# Patient Record
Sex: MALE | Race: WHITE | NOT HISPANIC OR LATINO | Employment: FULL TIME | URBAN - METROPOLITAN AREA
[De-identification: names, ages, dates, MRNs, and addresses within clinical notes are randomized per-mention and may not be internally consistent; named-entity substitution may affect disease eponyms.]

---

## 2023-02-10 ENCOUNTER — OFFICE VISIT (OUTPATIENT)
Dept: FAMILY MEDICINE | Facility: CLINIC | Age: 33
End: 2023-02-10
Payer: COMMERCIAL

## 2023-02-10 VITALS
HEIGHT: 70 IN | TEMPERATURE: 96.7 F | RESPIRATION RATE: 19 BRPM | SYSTOLIC BLOOD PRESSURE: 110 MMHG | OXYGEN SATURATION: 97 % | HEART RATE: 90 BPM | BODY MASS INDEX: 25.34 KG/M2 | WEIGHT: 177 LBS | DIASTOLIC BLOOD PRESSURE: 72 MMHG

## 2023-02-10 DIAGNOSIS — G44.209 TENSION HEADACHE: Primary | ICD-10-CM

## 2023-02-10 PROCEDURE — 99203 OFFICE O/P NEW LOW 30 MIN: CPT

## 2023-02-10 RX ORDER — CYCLOBENZAPRINE HCL 5 MG
5 TABLET ORAL 2 TIMES DAILY PRN
Qty: 10 TABLET | Refills: 0 | Status: SHIPPED | OUTPATIENT
Start: 2023-02-10

## 2023-02-10 ASSESSMENT — ENCOUNTER SYMPTOMS: HEADACHES: 1

## 2023-02-10 NOTE — PROGRESS NOTES
"  Assessment & Plan     Tension headache  Nightly unilateral headache relieved by ibuprofen.  Not accompanied by any visual changes, no known aura, no nausea vomiting.  Did have 1 episode of feeling unbalanced but has not had a repeat since.  Treat for tension headache and assess further if no relief with recommended stretching, heat, ice, and stress relief measures, as well as Flexeril.  - cyclobenzaprine (FLEXERIL) 5 MG tablet; Take 1 tablet (5 mg) by mouth 2 times daily as needed for muscle spasms    Patient instructed on when to seek immediate medical care.  Above instructions included in AVS.  Discussed ongoing evaluation should this plan of care not be effective, and patient will return to clinic in 10 days if no relief.    Patient advised on recommended screening including cholesterol and diabetes screening.  He reports he is overdue for an annual physical.  Also recommended for tetanus and influenza vaccination.             BMI:   Estimated body mass index is 25.4 kg/m  as calculated from the following:    Height as of this encounter: 1.778 m (5' 10\").    Weight as of this encounter: 80.3 kg (177 lb).           No follow-ups on file.    YASMANY Maldonado CNP  [facility]    Subjective   Matthew is a 32 year old, presenting for the following health issues:  Headache (Daily. Worse at night. )      Matthew is a 32-year-old male ambulatory to clinic accompanied by self.  He reports headaches every night for the past 2 weeks. Does seem to be improved with caffeine. Always on the right side. No n/v/d. No visions changes. No abnormal smells. Had episode of being off balance when headaches of initially began. No sensitivity to light or sound. Caffeine seems to improve symptoms. Used to get headaches with excessive salt in ramen, has tried drinking extra water to see if that helps. Does report some tightness in neck and shoulders. Slept in a different bed 2 weeks ago.  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    Headache     History of Present Illness       Headaches:   Since the patient's last clinic visit, headaches are: worsened  The patient is getting headaches:  Every night for the past 2 weeks  He is not able to do normal daily activities when he has a migraine.  The patient is taking the following rescue/relief medications:  Ibuprofen (Advil, Motrin)   Patient states \"I get total relief\" from the rescue/relief medications.   The patient is taking the following medications to prevent migraines:  No medications to prevent migraines  In the past 4 weeks, the patient has gone to an Urgent Care or Emergency Room 0 times times due to headaches.    He eats 0-1 servings of fruits and vegetables daily.He consumes 0 sweetened beverage(s) daily.He exercises with enough effort to increase his heart rate 9 or less minutes per day.  He exercises with enough effort to increase his heart rate 3 or less days per week.   He is taking medications regularly.             Review of Systems   Neurological: Positive for headaches.    One episode of imbalance while ambulating.   Constitutional, HEENT, cardiovascular, pulmonary, gi and gu systems are negative, except as otherwise noted.      Objective    /72 (BP Location: Right arm, Patient Position: Sitting, Cuff Size: Adult Regular)   Pulse 90   Temp (!) 96.7  F (35.9  C) (Tympanic)   Resp 19   Ht 1.778 m (5' 10\")   Wt 80.3 kg (177 lb)   SpO2 97%   BMI 25.40 kg/m    Body mass index is 25.4 kg/m .  Physical Exam                       "

## 2023-02-12 ENCOUNTER — HEALTH MAINTENANCE LETTER (OUTPATIENT)
Age: 33
End: 2023-02-12

## 2023-03-02 ENCOUNTER — OFFICE VISIT (OUTPATIENT)
Dept: FAMILY MEDICINE | Facility: CLINIC | Age: 33
End: 2023-03-02
Payer: COMMERCIAL

## 2023-03-02 VITALS
OXYGEN SATURATION: 99 % | TEMPERATURE: 97.9 F | SYSTOLIC BLOOD PRESSURE: 106 MMHG | HEART RATE: 101 BPM | WEIGHT: 181.1 LBS | DIASTOLIC BLOOD PRESSURE: 78 MMHG | BODY MASS INDEX: 25.99 KG/M2 | RESPIRATION RATE: 14 BRPM

## 2023-03-02 DIAGNOSIS — G43.009 MIGRAINE WITHOUT AURA AND WITHOUT STATUS MIGRAINOSUS, NOT INTRACTABLE: ICD-10-CM

## 2023-03-02 DIAGNOSIS — J32.9 RHINOSINUSITIS: Primary | ICD-10-CM

## 2023-03-02 PROBLEM — R51.9 HEADACHE: Status: ACTIVE | Noted: 2021-12-11

## 2023-03-02 PROCEDURE — 99213 OFFICE O/P EST LOW 20 MIN: CPT

## 2023-03-02 RX ORDER — SUMATRIPTAN 50 MG/1
50 TABLET, FILM COATED ORAL
Qty: 6 TABLET | Refills: 0 | Status: SHIPPED | OUTPATIENT
Start: 2023-03-02

## 2023-03-02 ASSESSMENT — ENCOUNTER SYMPTOMS
ALLERGIC/IMMUNOLOGIC NEGATIVE: 1
CARDIOVASCULAR NEGATIVE: 1
MUSCULOSKELETAL NEGATIVE: 1
HEMATOLOGIC/LYMPHATIC NEGATIVE: 1
HEADACHES: 1
RESPIRATORY NEGATIVE: 1
ENDOCRINE NEGATIVE: 1
EYES NEGATIVE: 1
CONSTITUTIONAL NEGATIVE: 1
GASTROINTESTINAL NEGATIVE: 1
PSYCHIATRIC NEGATIVE: 1

## 2023-03-02 NOTE — PROGRESS NOTES
"  Assessment & Plan     Rhinosinusitis  Patient with persistent headaches for 1 month, tenderness to right maxillary sinus with palpation. Frontal headache behind right eye currently most c/w sinusitis  - amoxicillin-clavulanate (AUGMENTIN) 875-125 MG tablet; Take 1 tablet by mouth 2 times daily for 10 days    Migraine without aura and without status migrainosus, not intractable  Ongoing evaluation for potential migraine headache.  Previously treated with muscle relaxant, heat and stretching for what was thought to be tension headaches.  He did not have significant relief with those treatments. He has had some new sensitivity to sound and light in the last 10 days, but headache has been daily with varying intensity.  Treatment with abortive therapy to try to see if this helps knock out his headache.  - SUMAtriptan (IMITREX) 50 MG tablet; Take 1 tablet (50 mg) by mouth at onset of headache for migraine May repeat in 2 hours. Max 4 tablets/24 hours.             BMI:   Estimated body mass index is 25.99 kg/m  as calculated from the following:    Height as of 2/10/23: 1.778 m (5' 10\").    Weight as of this encounter: 82.1 kg (181 lb 1.6 oz).     Patient instructed to follow-up in 10 days if no relief with these treatments. Will consider imaging at that time as mom has history of brain tumor.    No follow-ups on file.    YASMANY Maldonado CNP [facility] [place] [facility]    Betzy Castro is a 32 year old accompanied by his self, presenting for the following health issues:  Headache (Follow-Up ; medication does not seem to be helping.)    Tried flexeril, heating pad and stretches. Has had some insances of difficulty focusing eyes, had to blink to get it to focus. Sunday was sensitive to light and sound. Headaches have been daily, some days worse than others. Has continued to have headaches, feels it in the am and evening. Varying in intensity.     Headache     History of Present Illness " "      Headaches:   Since the patient's last clinic visit, headaches are: no change  The patient is getting headaches:  4 times a week  He is not able to do normal daily activities when he has a migraine.  The patient is taking the following rescue/relief medications:  Ibuprofen (Advil, Motrin)   Patient states \"I get some relief\" from the rescue/relief medications.   The patient is taking the following medications to prevent migraines:  No medications to prevent migraines  In the past 4 weeks, the patient has gone to an Urgent Care or Emergency Room 0 times times due to headaches.    He eats 0-1 servings of fruits and vegetables daily.He consumes 0 sweetened beverage(s) daily.He exercises with enough effort to increase his heart rate 9 or less minutes per day.  He exercises with enough effort to increase his heart rate 3 or less days per week.   He is taking medications regularly.            Review of Systems   Constitutional: Negative.    HENT: Negative.    Eyes: Negative.    Respiratory: Negative.    Cardiovascular: Negative.    Gastrointestinal: Negative.    Endocrine: Negative.    Genitourinary: Negative.    Musculoskeletal: Negative.    Skin: Negative.    Allergic/Immunologic: Negative.    Neurological: Positive for headaches.   Hematological: Negative.    Psychiatric/Behavioral: Negative.             Objective    /78   Pulse 101   Temp 97.9  F (36.6  C)   Resp 14   Wt 82.1 kg (181 lb 1.6 oz)   SpO2 99%   BMI 25.99 kg/m    Body mass index is 25.99 kg/m .  Physical Exam  Constitutional:       Appearance: Normal appearance.   HENT:      Head: Normocephalic and atraumatic.        Nose: Nose normal.      Mouth/Throat:      Mouth: Mucous membranes are moist.      Pharynx: Oropharynx is clear. No posterior oropharyngeal erythema.   Eyes:      Pupils: Pupils are equal, round, and reactive to light.   Cardiovascular:      Rate and Rhythm: Normal rate and regular rhythm.      Heart sounds: Normal heart " sounds.   Pulmonary:      Breath sounds: Normal breath sounds.   Musculoskeletal:      Cervical back: Neck supple. No tenderness.   Lymphadenopathy:      Cervical: No cervical adenopathy.   Skin:     General: Skin is warm and dry.      Capillary Refill: Capillary refill takes less than 2 seconds.   Neurological:      General: No focal deficit present.      Mental Status: He is alert and oriented to person, place, and time.      Sensory: No sensory deficit.      Motor: No weakness.

## 2024-03-10 ENCOUNTER — HEALTH MAINTENANCE LETTER (OUTPATIENT)
Age: 34
End: 2024-03-10

## 2025-01-21 ENCOUNTER — TRANSFERRED RECORDS (OUTPATIENT)
Dept: HEALTH INFORMATION MANAGEMENT | Facility: CLINIC | Age: 35
End: 2025-01-21
Payer: COMMERCIAL

## 2025-03-16 ENCOUNTER — HEALTH MAINTENANCE LETTER (OUTPATIENT)
Age: 35
End: 2025-03-16